# Patient Record
Sex: FEMALE | Race: WHITE | NOT HISPANIC OR LATINO | URBAN - METROPOLITAN AREA
[De-identification: names, ages, dates, MRNs, and addresses within clinical notes are randomized per-mention and may not be internally consistent; named-entity substitution may affect disease eponyms.]

---

## 2017-09-06 ENCOUNTER — APPOINTMENT (OUTPATIENT)
Age: 37
Setting detail: DERMATOLOGY
End: 2017-09-07

## 2017-09-06 VITALS
DIASTOLIC BLOOD PRESSURE: 66 MMHG | HEART RATE: 77 BPM | WEIGHT: 120 LBS | HEIGHT: 66 IN | SYSTOLIC BLOOD PRESSURE: 114 MMHG

## 2017-09-06 DIAGNOSIS — F17.200 NICOTINE DEPENDENCE, UNSPECIFIED, UNCOMPLICATED: ICD-10-CM

## 2017-09-06 DIAGNOSIS — L40.0 PSORIASIS VULGARIS: ICD-10-CM

## 2017-09-06 PROCEDURE — OTHER PRESCRIPTION: OTHER

## 2017-09-06 PROCEDURE — OTHER COUNSELING: OTHER

## 2017-09-06 PROCEDURE — OTHER PATIENT SPECIFIC COUNSELING: OTHER

## 2017-09-06 PROCEDURE — 99202 OFFICE O/P NEW SF 15 MIN: CPT

## 2017-09-06 PROCEDURE — OTHER MIPS QUALITY: OTHER

## 2017-09-06 RX ORDER — CALCIPOTRIENE 50 UG/G
CREAM TOPICAL
Qty: 1 | Refills: 3 | Status: ERX | COMMUNITY
Start: 2017-09-06

## 2017-09-06 RX ORDER — CLOBETASOL PROPIONATE 0.05 %
CREAM (GRAM) TOPICAL
Qty: 1 | Refills: 3 | Status: ERX | COMMUNITY
Start: 2017-09-06

## 2017-09-06 ASSESSMENT — LOCATION DETAILED DESCRIPTION DERM
LOCATION DETAILED: RIGHT KNEE
LOCATION DETAILED: RIGHT ELBOW
LOCATION DETAILED: LEFT DISTAL POSTERIOR THIGH
LOCATION DETAILED: LEFT ELBOW

## 2017-09-06 ASSESSMENT — LOCATION ZONE DERM
LOCATION ZONE: LEG
LOCATION ZONE: ARM

## 2017-09-06 ASSESSMENT — LOCATION SIMPLE DESCRIPTION DERM
LOCATION SIMPLE: LEFT POSTERIOR THIGH
LOCATION SIMPLE: RIGHT ELBOW
LOCATION SIMPLE: LEFT ELBOW
LOCATION SIMPLE: RIGHT KNEE

## 2017-09-06 ASSESSMENT — BSA PSORIASIS: % BODY COVERED IN PSORIASIS: 1

## 2017-09-06 ASSESSMENT — PGA PSORIASIS: PGA PSORIASIS: MILD (MILD PLAQUE ELEVATION, LIGHT ERYTHEMA, FINE SCALE PREDOMINATES)

## 2017-09-06 NOTE — PROCEDURE: MIPS QUALITY
Detail Level: Zone
Quality 131: Pain Assessment And Follow-Up: Pain assessment using a standardized tool is documented as negative, no follow-up plan required
Quality 226: Preventive Care And Screening: Tobacco Use: Screening And Cessation Intervention: Patient screened for tobacco and is a smoker AND received Cessation Counseling

## 2017-09-06 NOTE — HPI: RASH (PSORIASIS)
Do You Have A Family History Of Psoriasis?: no
How Severe Is Your Psoriasis?: mild
Is This A New Presentation, Or A Follow-Up?: Psoriasis
Additional History: Pain 0/10. Patient was previously treated for this condition by Dr. Chilel's office however she has not been every a Rheumatologist.

## 2017-09-06 NOTE — PROCEDURE: PATIENT SPECIFIC COUNSELING
Detail Level: Detailed
Explained etiology of condition in detail including condition is chronic and will wax and wane with treatment. Cutaneous affliction of the condition may be controlled with topical medications however joint pain should be evaluated by a Rheumatologist to delineate whether the condition may be psoriatic arthritis. Advised continued use of Clobetasol cream in combination with prescribed vitamin D derivative. Saran Wrap may also be applied following use of medication for optimum absorption. Discussed UVB therapy in addition of topical therapies however this is deferred at this time. Patient is to be referred to rheumatology and we will re-evaluate in three months.